# Patient Record
Sex: FEMALE | Race: WHITE | NOT HISPANIC OR LATINO | URBAN - METROPOLITAN AREA
[De-identification: names, ages, dates, MRNs, and addresses within clinical notes are randomized per-mention and may not be internally consistent; named-entity substitution may affect disease eponyms.]

---

## 2021-07-26 ENCOUNTER — OFFICE VISIT (OUTPATIENT)
Dept: URBAN - METROPOLITAN AREA CLINIC 33 | Facility: CLINIC | Age: 21
End: 2021-07-26

## 2021-07-26 ENCOUNTER — TELEPHONE ENCOUNTER (OUTPATIENT)
Dept: URBAN - METROPOLITAN AREA CLINIC 35 | Facility: CLINIC | Age: 21
End: 2021-07-26

## 2021-07-26 VITALS
DIASTOLIC BLOOD PRESSURE: 65 MMHG | BODY MASS INDEX: 20.58 KG/M2 | SYSTOLIC BLOOD PRESSURE: 105 MMHG | OXYGEN SATURATION: 99 % | WEIGHT: 109 LBS | HEART RATE: 73 BPM | HEIGHT: 61 IN

## 2021-07-26 RX ORDER — SODIUM, POTASSIUM,MAG SULFATES 17.5-3.13G
ML AS DIRECTED SOLUTION, RECONSTITUTED, ORAL ORAL
Qty: 1 KIT | Refills: 0 | OUTPATIENT
Start: 2021-07-26

## 2021-07-26 RX ORDER — AMOXICILLIN AND CLAVULANATE POTASSIUM 500; 125 MG/1; 1/1
1 TABLET TABLET, FILM COATED ORAL
Qty: 20 | Status: ON HOLD | COMMUNITY

## 2021-07-26 RX ORDER — LISDEXAMFETAMINE DIMESYLATE 50 MG
1 CAPSULE IN THE MORNING CAPSULE ORAL ONCE A DAY
Status: ON HOLD | COMMUNITY

## 2021-07-26 RX ORDER — NORGESTIMATE AND ETHINYL ESTRADIOL 0.25-0.035
1 TABLET KIT ORAL ONCE A DAY
Qty: 28 | Status: ACTIVE | COMMUNITY

## 2021-07-26 NOTE — HPI-MIGRATED HPI
;   ;     Hospital follow up : 21 y/o female patient presented at [facility] on (07/09/2021) for abdominal pain and back pain, which started on (07/08/2021). Patient admitted blood stools.  Patient's workups include labs with normal WBCs and hemoglobin.  However, CT of abdomen shows high density material in the cecum, suspicious for active GI bleed. No EGD or colonoscopy were performed while in the ED.   Labs (07/09/2021) CO2 20 L Na 135 L BUN 8 L ALP 44 L Osmo 268 L  CT abd (07/09/2021) 1. High density material in the cecum is suspicious for active GI bleeding. However, this may represent ingested contents. Further evaluation could be obtained with CT angiogram of the abdomen of the abd and pelvis without and with intravenous contrast. 2. Colonic mucosal hyperenhancement, likely infectious or inflammatory colitis.    ;   Abdominal Pain : 21 y/o female patient presents today with lower abdominal pain which radiates towards her back. The onset was (07/06/2021).  The course / duration of symptoms is intermittent.  The degree of symptoms is moderate to severe. Patient notes that it is a dull pain. The exacerbating factor is none. The relieving factor is none.  Patient admits to 5 loose and watery bowel movements per day, with no mucus, melena, or blood in stool.  Patient admits bloating/gas, indigestion, or changes in bowel habits. Patient denies associated epigastric pain, nausea, vomiting, fever, chills, dizziness,  dysphagia, globus, sour eructations,  early satiety, changes in appetite, coughing.  Patient denies past colonoscopy performed. Patient admits any family history of colonic cancer/disease/polyps.  ;

## 2021-07-27 ENCOUNTER — TELEPHONE ENCOUNTER (OUTPATIENT)
Dept: URBAN - METROPOLITAN AREA CLINIC 35 | Facility: CLINIC | Age: 21
End: 2021-07-27

## 2021-07-27 RX ORDER — SODIUM PICOSULFATE, MAGNESIUM OXIDE, AND ANHYDROUS CITRIC ACID 10; 3.5; 12 MG/160ML; G/160ML; G/160ML
ML LIQUID ORAL AS DIRECTED
Qty: 1 | Refills: 0 | OUTPATIENT
Start: 2021-07-27

## 2021-07-29 ENCOUNTER — OFFICE VISIT (OUTPATIENT)
Dept: URBAN - METROPOLITAN AREA SURGERY CENTER 8 | Facility: SURGERY CENTER | Age: 21
End: 2021-07-29

## 2021-08-09 ENCOUNTER — DASHBOARD ENCOUNTERS (OUTPATIENT)
Age: 21
End: 2021-08-09

## 2021-08-09 ENCOUNTER — OFFICE VISIT (OUTPATIENT)
Dept: URBAN - METROPOLITAN AREA CLINIC 33 | Facility: CLINIC | Age: 21
End: 2021-08-09

## 2021-08-09 VITALS
BODY MASS INDEX: 20.39 KG/M2 | OXYGEN SATURATION: 97 % | HEART RATE: 90 BPM | SYSTOLIC BLOOD PRESSURE: 115 MMHG | DIASTOLIC BLOOD PRESSURE: 70 MMHG | HEIGHT: 61 IN | WEIGHT: 108 LBS

## 2021-08-09 RX ORDER — AMOXICILLIN AND CLAVULANATE POTASSIUM 500; 125 MG/1; 1/1
1 TABLET TABLET, FILM COATED ORAL
Qty: 20 | Status: ON HOLD | COMMUNITY

## 2021-08-09 RX ORDER — NORGESTIMATE AND ETHINYL ESTRADIOL 0.25-0.035
1 TABLET KIT ORAL ONCE A DAY
Qty: 28 | Status: ACTIVE | COMMUNITY

## 2021-08-09 RX ORDER — SODIUM PICOSULFATE, MAGNESIUM OXIDE, AND ANHYDROUS CITRIC ACID 10; 3.5; 12 MG/160ML; G/160ML; G/160ML
ML LIQUID ORAL AS DIRECTED
Qty: 1 | Refills: 0 | Status: ON HOLD | COMMUNITY
Start: 2021-07-27

## 2021-08-09 RX ORDER — MESALAMINE 1.2 G/1
2 TABLETS TABLET, DELAYED RELEASE ORAL ONCE A DAY
Qty: 60 | Refills: 6 | OUTPATIENT
Start: 2021-08-09

## 2021-08-09 RX ORDER — LISDEXAMFETAMINE DIMESYLATE 50 MG
1 CAPSULE IN THE MORNING CAPSULE ORAL ONCE A DAY
Status: ON HOLD | COMMUNITY

## 2021-08-09 RX ORDER — SODIUM, POTASSIUM,MAG SULFATES 17.5-3.13G
ML AS DIRECTED SOLUTION, RECONSTITUTED, ORAL ORAL
Qty: 1 KIT | Refills: 0 | Status: ON HOLD | COMMUNITY
Start: 2021-07-26

## 2021-08-09 NOTE — HPI-MIGRATED HPI
;   ;   ;   ;   ;     Follow up- EGD : Patient presents today for follow up to her EGD which was completed on   (07/29/2021)  by Dr. Paulo Kaur.  Patient denies any complications after her procedure. Since the procedure patient denies dysphagia, heartburn, globus, changes in appetite,  or changes in bowel habits. Patient admits intermittent abdominal/epigastric pain.   EGD report shows: 1. Erythematous mucosa in the antrum and gastric body. Biopsied. 2. Erythematous mucosa in the esophagus. Biopsied.;   Hospital follow up :       Last visit (07/26/2021) 21 y/o female patient presented at [facility] on (07/09/2021) for abdominal pain and back pain, which started on (07/08/2021). Patient admitted blood stools.  Patient's workups include labs with normal WBCs and hemoglobin.  However, CT of abdomen shows high density material in the cecum, suspicious for active GI bleed. No EGD or colonoscopy were performed while in the ED.   Labs (07/09/2021) CO2 20 L Na 135 L BUN 8 L ALP 44 L Osmo 268 L  CT abd (07/09/2021) 1. High density material in the cecum is suspicious for active GI bleeding. However, this may represent ingested contents. Further evaluation could be obtained with CT angiogram of the abdomen of the abd and pelvis without and with intravenous contrast. 2. Colonic mucosal hyperenhancement, likely infectious or inflammatory colitis.;   Colonoscopy Follow-Up : Patient presents today for follow up to her colonoscopy which was completed on   (07/29/2021) by Dr. Paulo Kaur.  Patient denies any complications after her procedure. Patient currently admits 4-5 loose and  formed bowel movements per day.  Patient denies any melena, blood or mucus in stools.   Patient admits intermittent abdominal/epigastric pain. Patient denies any associated heartburn, bloating, or gas.   Colonoscopy report shows: 1. Erythematous mucosa in the ascending colon, in the sigmoid colon, in the descending colon and in the transverse colon. Biopsied. 2. Ulcerated mucosa in the terminal ileum. Biopsied.       ;   Rectal Bleeding : Patient denies any rectal bleeding since her last visit.  Patient currently admits 4-5 loose and  formed bowel movements per day.  Patient denies any melena, blood or mucus in stools. Patient denies any associated heartburn, bloating, or gas.   ;   Abdominal Pain : Patient admits intermittent abdominal/epigastric pain. Patient currently admits 4-5 loose and  formed bowel movements per day.  Patient denies any melena, blood or mucus in stools. Patient denies any associated heartburn, bloating, or gas.      Last visit (07/26/2021) 21 y/o female patient presents today with lower abdominal pain which radiates towards her back. The onset was (07/06/2021).  The course / duration of symptoms is intermittent.  The degree of symptoms is moderate to severe. Patient notes that it is a dull pain. The exacerbating factor is none. The relieving factor is none.  Patient admits to 5 loose and watery bowel movements per day, with no mucus, melena, or blood in stool.  Patient admits bloating/gas, indigestion, or changes in bowel habits. Patient denies associated epigastric pain, nausea, vomiting, fever, chills, dizziness,  dysphagia, globus, sour eructations,  early satiety, changes in appetite, coughing.  Patient denies past colonoscopy performed. Patient admits any family history of colonic cancer/disease/polyps.;

## 2021-10-11 ENCOUNTER — OFFICE VISIT (OUTPATIENT)
Dept: URBAN - METROPOLITAN AREA CLINIC 33 | Facility: CLINIC | Age: 21
End: 2021-10-11

## 2021-10-11 NOTE — HPI-MIGRATED HPI
;   ;   ;   ;   ;     Follow up- EGD : Patient presents today for follow up to her EGD which was completed on   (07/29/2021)  by Dr. Paulo Kaur.  Patient denies any complications after her procedure. Since the procedure patient denies dysphagia, heartburn, globus, changes in appetite,  or changes in bowel habits. Patient admits intermittent abdominal/epigastric pain.   EGD report shows: 1. Erythematous mucosa in the antrum and gastric body. Biopsied. 2. Erythematous mucosa in the esophagus. Biopsied.;   Hospital follow up : Last visit (07/26/2021) 19 y/o female patient presented at [facility] on (07/09/2021) for abdominal pain and back pain, which started on (07/08/2021). Patient admitted blood stools.  Patient's workups include labs with normal WBCs and hemoglobin.  However, CT of abdomen shows high density material in the cecum, suspicious for active GI bleed. No EGD or colonoscopy were performed while in the ED.   Labs (07/09/2021) CO2 20 L Na 135 L BUN 8 L ALP 44 L Osmo 268 L  CT abd (07/09/2021) 1. High density material in the cecum is suspicious for active GI bleeding. However, this may represent ingested contents. Further evaluation could be obtained with CT angiogram of the abdomen of the abd and pelvis without and with intravenous contrast. 2. Colonic mucosal hyperenhancement, likely infectious or inflammatory colitis.;   Colonoscopy Follow-Up : Patient presents today for follow up to her colonoscopy which was completed on   (07/29/2021) by Dr. Paulo Kaur.  Patient denies any complications after her procedure. Patient currently admits 4-5 loose and  formed bowel movements per day.  Patient denies any melena, blood or mucus in stools.   Patient admits intermittent abdominal/epigastric pain. Patient denies any associated heartburn, bloating, or gas.   Colonoscopy report shows: 1. Erythematous mucosa in the ascending colon, in the sigmoid colon, in the descending colon and in the transverse colon. Biopsied. 2. Ulcerated mucosa in the terminal ileum. Biopsied.;   Rectal Bleeding : Patient denies ??? any rectal bleeding since her last visit.                  Last visit (08/09/2021)  Patient denies any rectal bleeding since her last visit.  Patient currently admits 4-5 loose and  formed bowel movements per day.  Patient denies any melena, blood or mucus in stools. Patient denies any associated heartburn, bloating, or gas.;   Abdominal Pain : 22 y/o patient presents today for follow up of her diagnosis of ileitis. Patient currently admits??? continued abdominal pain. Patient states that she currently has --- bowel movements per day. Patient denies any melena, blood or mucus in stools. Patient denies any associated heartburn, bloating, or gas. Patient continues to ??? take mesalamine 1.2 GM and finds relief of symptoms.              Last visit (08/09/2021)  Patient admits intermittent abdominal/epigastric pain. Patient currently admits 4-5 loose and  formed bowel movements per day.  Patient denies any melena, blood or mucus in stools. Patient denies any associated heartburn, bloating, or gas.      Last visit (07/26/2021) 19 y/o female patient presents today with lower abdominal pain which radiates towards her back. The onset was (07/06/2021).  The course / duration of symptoms is intermittent.  The degree of symptoms is moderate to severe. Patient notes that it is a dull pain. The exacerbating factor is none. The relieving factor is none.  Patient admits to 5 loose and watery bowel movements per day, with no mucus, melena, or blood in stool.  Patient admits bloating/gas, indigestion, or changes in bowel habits. Patient denies associated epigastric pain, nausea, vomiting, fever, chills, dizziness,  dysphagia, globus, sour eructations,  early satiety, changes in appetite, coughing.  Patient denies past colonoscopy performed. Patient admits any family history of colonic cancer/disease/polyps.;